# Patient Record
Sex: FEMALE | Race: WHITE | NOT HISPANIC OR LATINO
[De-identification: names, ages, dates, MRNs, and addresses within clinical notes are randomized per-mention and may not be internally consistent; named-entity substitution may affect disease eponyms.]

---

## 2018-03-05 ENCOUNTER — FORM ENCOUNTER (OUTPATIENT)
Age: 63
End: 2018-03-05

## 2019-05-13 ENCOUNTER — APPOINTMENT (OUTPATIENT)
Dept: DERMATOLOGY | Facility: CLINIC | Age: 64
End: 2019-05-13
Payer: COMMERCIAL

## 2019-05-13 VITALS — WEIGHT: 155 LBS | HEIGHT: 64 IN | BODY MASS INDEX: 26.46 KG/M2

## 2019-05-13 DIAGNOSIS — Z91.89 OTHER SPECIFIED PERSONAL RISK FACTORS, NOT ELSEWHERE CLASSIFIED: ICD-10-CM

## 2019-05-13 DIAGNOSIS — L23.7 ALLERGIC CONTACT DERMATITIS DUE TO PLANTS, EXCEPT FOOD: ICD-10-CM

## 2019-05-13 PROBLEM — Z00.00 ENCOUNTER FOR PREVENTIVE HEALTH EXAMINATION: Status: ACTIVE | Noted: 2019-05-13

## 2019-05-13 PROCEDURE — 99213 OFFICE O/P EST LOW 20 MIN: CPT

## 2019-05-13 RX ORDER — LEVOTHYROXINE SODIUM 0.12 MG/1
125 TABLET ORAL
Qty: 90 | Refills: 0 | Status: ACTIVE | COMMUNITY
Start: 2019-04-10

## 2019-05-13 RX ORDER — ATORVASTATIN CALCIUM 10 MG/1
10 TABLET, FILM COATED ORAL
Qty: 90 | Refills: 0 | Status: ACTIVE | COMMUNITY
Start: 2019-03-26

## 2019-05-13 RX ORDER — PANTOPRAZOLE 40 MG/1
40 TABLET, DELAYED RELEASE ORAL
Qty: 90 | Refills: 0 | Status: ACTIVE | COMMUNITY
Start: 2019-02-24

## 2019-05-13 NOTE — PHYSICAL EXAM
[Alert] : alert [Oriented x 3] : ~L oriented x 3 [Face] : Face [Well Nourished] : well nourished [Nose] : Nose [Eyelids] : Eyelids [Ears] : Ears [Lips] : Lips [FreeTextEntry3] : Right volar wrist:  1.5 cm, erythematous, crusted plaque with a few nearby 2X2 mm papules, some are slightly eroded\par Faint vertical linear streak present on the right proximal volar forearm

## 2019-05-13 NOTE — ASSESSMENT
[FreeTextEntry1] : Rash consistent with an allergic contact dermatitis-probably poison ivy\par ? Secondary allergic contact dermatitis to bacitracin

## 2019-05-13 NOTE — HISTORY OF PRESENT ILLNESS
[FreeTextEntry1] : Poison ivy [de-identified] : Followup visit for 63-year-old white female presenting with a persistent rash on the right wrist previously diagnosed as poison ivy. Patient was treated by her primary care physician with a Medrol dose pack. Rash subsequently spread up the right forearm. Was seen at a walk-in clinic where she was treated with an IM shot of Kenalog and a 12 day course of prednisone. The rash on the forearm has improved markedly, but the rash of the right wrist persists.\par Right wrist remains very itchy. \par Note-patient has been applying bacitracin ointment to this area.\par Note-has had poison ivy in the past.

## 2019-05-17 ENCOUNTER — OTHER (OUTPATIENT)
Age: 64
End: 2019-05-17

## 2020-09-23 ENCOUNTER — APPOINTMENT (OUTPATIENT)
Dept: DERMATOLOGY | Facility: CLINIC | Age: 65
End: 2020-09-23
Payer: COMMERCIAL

## 2020-09-23 DIAGNOSIS — L29.9 PRURITUS, UNSPECIFIED: ICD-10-CM

## 2020-09-23 DIAGNOSIS — D22.5 MELANOCYTIC NEVI OF TRUNK: ICD-10-CM

## 2020-09-23 DIAGNOSIS — L82.1 OTHER SEBORRHEIC KERATOSIS: ICD-10-CM

## 2020-09-23 PROCEDURE — 99213 OFFICE O/P EST LOW 20 MIN: CPT

## 2020-09-23 RX ORDER — HALOBETASOL PROPIONATE 0.5 MG/G
0.05 CREAM TOPICAL
Qty: 1 | Refills: 1 | Status: DISCONTINUED | COMMUNITY
Start: 2019-05-13 | End: 2020-09-23

## 2020-09-23 NOTE — ASSESSMENT
[FreeTextEntry1] : Multiple very small seborrheic keratoses present on face, neck, trunk\par Pruritus of neck of uncertain etiology? Subclinical eczematous dermatitis

## 2020-09-23 NOTE — HISTORY OF PRESENT ILLNESS
[FreeTextEntry1] : Evaluation of growths and itching of the neck [de-identified] : Followup visit for 64-year-old white female last seen by me on May 13 on the 2019, presenting for evaluation of growths.  Particularly concerned about lesions on the face.\par Patient also complains of episodic itching of the anterior neck present for a few weeks. Self treated with 1% hydrocortisone cream without improvement. No previous episodes.

## 2020-09-23 NOTE — PHYSICAL EXAM
[Alert] : alert [Oriented x 3] : ~L oriented x 3 [Well Nourished] : well nourished [FreeTextEntry3] : The following areas were examined and no significant abnormalities were seen except as noted below:\par \par Type II skin\par \par scalp, face, eyelids, nose, lips, ears, neck, chest, abdomen, back, buttocks, right arm, left arm, right hand, left hand,\par right  leg, left leg, right foot, left foot\par Breast and groin exams offered and declined by patient.\par \par Right lateral forehead: Few 2 mm tan verrucous papules\par Neck: Moderate 2 mm black verrucous papules\par No rash seen\par Back: Few tan smooth papules\par Moderate small and few moderate sized verrucous papules\par \par No suspicious lesions seen

## 2021-07-27 ENCOUNTER — FORM ENCOUNTER (OUTPATIENT)
Age: 66
End: 2021-07-27

## 2021-08-08 ENCOUNTER — FORM ENCOUNTER (OUTPATIENT)
Age: 66
End: 2021-08-08

## 2021-12-03 DIAGNOSIS — E03.9 HYPOTHYROIDISM, UNSPECIFIED: ICD-10-CM

## 2021-12-03 DIAGNOSIS — E78.00 PURE HYPERCHOLESTEROLEMIA, UNSPECIFIED: ICD-10-CM

## 2021-12-03 DIAGNOSIS — N64.59 OTHER SIGNS AND SYMPTOMS IN BREAST: ICD-10-CM

## 2021-12-03 DIAGNOSIS — Z87.39 PERSONAL HISTORY OF OTHER DISEASES OF THE MUSCULOSKELETAL SYSTEM AND CONNECTIVE TISSUE: ICD-10-CM

## 2022-07-29 ENCOUNTER — APPOINTMENT (OUTPATIENT)
Dept: OBGYN | Facility: CLINIC | Age: 67
End: 2022-07-29

## 2022-07-29 VITALS
WEIGHT: 155 LBS | BODY MASS INDEX: 26.46 KG/M2 | SYSTOLIC BLOOD PRESSURE: 126 MMHG | DIASTOLIC BLOOD PRESSURE: 74 MMHG | HEIGHT: 64 IN

## 2022-07-29 DIAGNOSIS — Z11.51 ENCOUNTER FOR SCREENING FOR HUMAN PAPILLOMAVIRUS (HPV): ICD-10-CM

## 2022-07-29 DIAGNOSIS — Z12.39 ENCOUNTER FOR OTHER SCREENING FOR MALIGNANT NEOPLASM OF BREAST: ICD-10-CM

## 2022-07-29 DIAGNOSIS — Z11.3 ENCOUNTER FOR SCREENING FOR INFECTIONS WITH A PREDOMINANTLY SEXUAL MODE OF TRANSMISSION: ICD-10-CM

## 2022-07-29 PROCEDURE — 99397 PER PM REEVAL EST PAT 65+ YR: CPT

## 2022-07-29 NOTE — HISTORY OF PRESENT ILLNESS
[Patient reported colonoscopy was normal] : Patient reported colonoscopy was normal [HIV test declined] : HIV test declined [Syphilis test declined] : Syphilis test declined [Gonorrhea test declined] : Gonorrhea test declined [Chlamydia test declined] : Chlamydia test declined [Trichomonas test declined] : Trichomonas test declined [HPV test declined] : HPV test declined [Hepatitis B test declined] : Hepatitis B test declined [postmenopausal] : postmenopausal [N] : Patient does not use contraception [Y] : Positive pregnancy history [Currently Active] : currently active [Men] : men [Vaginal] : vaginal [No] : No [TextBox_4] : PT HERE FOR ANNUAL EXAM.  [Mammogramdate] : 08/09/21 [TextBox_19] : BR2 [BreastSonogramDate] : 08/09/21 [TextBox_25] : BR2 [PapSmeardate] : 07/28/21 [TextBox_31] : NORMAL [BoneDensityDate] : 08/09/21 [TextBox_37] : OSTEOPENIA [ColonoscopyDate] : 01/2020 [LMPDate] : 2015 [PGHxTotal] : 2 [[facility] : 2 [[facility] : 2 [TextBox_6] : 2015 [FreeTextEntry1] : 2015

## 2022-07-29 NOTE — PHYSICAL EXAM
[Chaperone Present] : A chaperone was present in the examining room during all aspects of the physical examination [Appropriately responsive] : appropriately responsive [Alert] : alert [No Acute Distress] : no acute distress [No Lymphadenopathy] : no lymphadenopathy [Regular Rate Rhythm] : regular rate rhythm [No Murmurs] : no murmurs [Clear to Auscultation B/L] : clear to auscultation bilaterally [Soft] : soft [Non-tender] : non-tender [Non-distended] : non-distended [No HSM] : No HSM [No Lesions] : no lesions [No Mass] : no mass [Oriented x3] : oriented x3 [Labia Majora] : normal [Labia Minora] : normal [Atrophy] : atrophy [Normal] : normal [Uterine Adnexae] : normal [Declined] : Patient declined rectal exam [FreeTextEntry1] : SM [FreeTextEntry6] : Symmetrical [FreeTextEntry8] : No masses nontender bilateral

## 2022-07-29 NOTE — PLAN
[FreeTextEntry1] : 66-year-old postmenopausal female with atrophic vaginitis and dyspareunia patient will start Vagifem 10 mcg nightly for 2 weeks and then twice weekly all risk benefits pros cons alternatives discussed in layman's terms questions answered, breast self-examination taught her for mammogram bilateral breast sono we will repeat bone density in 1 year we will take vitamin D3 2000 IUs a day with multivitamin return in 6 months

## 2022-08-01 LAB
C TRACH RRNA SPEC QL NAA+PROBE: NOT DETECTED
HPV HIGH+LOW RISK DNA PNL CVX: NOT DETECTED
N GONORRHOEA RRNA SPEC QL NAA+PROBE: NOT DETECTED
SOURCE TP AMPLIFICATION: NORMAL

## 2022-08-03 LAB — CYTOLOGY CVX/VAG DOC THIN PREP: ABNORMAL

## 2022-08-31 ENCOUNTER — RESULT REVIEW (OUTPATIENT)
Age: 67
End: 2022-08-31

## 2023-01-03 ENCOUNTER — APPOINTMENT (OUTPATIENT)
Dept: OBGYN | Facility: CLINIC | Age: 68
End: 2023-01-03
Payer: MEDICARE

## 2023-01-03 VITALS
HEIGHT: 64 IN | SYSTOLIC BLOOD PRESSURE: 122 MMHG | BODY MASS INDEX: 26.98 KG/M2 | DIASTOLIC BLOOD PRESSURE: 70 MMHG | WEIGHT: 158 LBS

## 2023-01-03 PROCEDURE — 99213 OFFICE O/P EST LOW 20 MIN: CPT

## 2023-01-03 NOTE — PLAN
[FreeTextEntry1] : Continue Yuvafem risk-benefit analysis questions answered in layman's terms pros cons discussed.  Return in 6 months

## 2023-01-03 NOTE — HISTORY OF PRESENT ILLNESS
[postmenopausal] : postmenopausal [N] : Patient does not use contraception [Y] : Positive pregnancy history [Currently Active] : currently active [Men] : men [Vaginal] : vaginal [No] : No [TextBox_4] : PT HERE TO RENEW YUVAFEM [LMPDate] : 2015 [PGHxTotal] : 2 [[facility]] : 2 [[facility]] : 2 [TextBox_6] : 2008 [TextBox_9] : 12 [FreeTextEntry1] : 2008

## 2023-01-03 NOTE — PHYSICAL EXAM
[Chaperone Present] : A chaperone was present in the examining room during all aspects of the physical examination [Labia Majora] : normal [Labia Minora] : normal [Atrophy] : atrophy [Normal] : normal [Uterine Adnexae] : normal [FreeTextEntry1] : SM

## 2023-07-31 ENCOUNTER — NON-APPOINTMENT (OUTPATIENT)
Age: 68
End: 2023-07-31

## 2023-07-31 ENCOUNTER — APPOINTMENT (OUTPATIENT)
Dept: OBGYN | Facility: CLINIC | Age: 68
End: 2023-07-31
Payer: MEDICARE

## 2023-07-31 VITALS
HEIGHT: 64 IN | BODY MASS INDEX: 26.46 KG/M2 | SYSTOLIC BLOOD PRESSURE: 154 MMHG | DIASTOLIC BLOOD PRESSURE: 88 MMHG | WEIGHT: 155 LBS

## 2023-07-31 DIAGNOSIS — Z01.419 ENCOUNTER FOR GYNECOLOGICAL EXAMINATION (GENERAL) (ROUTINE) W/OUT ABNORMAL FINDINGS: ICD-10-CM

## 2023-07-31 DIAGNOSIS — R92.2 INCONCLUSIVE MAMMOGRAM: ICD-10-CM

## 2023-07-31 DIAGNOSIS — Z13.820 ENCOUNTER FOR SCREENING FOR OSTEOPOROSIS: ICD-10-CM

## 2023-07-31 PROCEDURE — G0101: CPT

## 2023-07-31 NOTE — PHYSICAL EXAM
[Chaperone Present] : A chaperone was present in the examining room during all aspects of the physical examination [FreeTextEntry1] : SM [Appropriately responsive] : appropriately responsive [Alert] : alert [No Acute Distress] : no acute distress [No Lymphadenopathy] : no lymphadenopathy [Regular Rate Rhythm] : regular rate rhythm [No Murmurs] : no murmurs [Clear to Auscultation B/L] : clear to auscultation bilaterally [Soft] : soft [Non-tender] : non-tender [Non-distended] : non-distended [No HSM] : No HSM [No Lesions] : no lesions [No Mass] : no mass [Oriented x3] : oriented x3 [Examination Of The Breasts] : a normal appearance [Breast Palpation Diffuse Fibrous Tissue Bilateral] : fibrocystic changes [No Masses] : no breast masses were palpable [Labia Majora] : normal [Labia Minora] : normal [Atrophy] : atrophy [Normal] : normal [Anteversion] : anteverted [Uterine Adnexae] : normal [Declined] : Patient declined rectal exam [FreeTextEntry8] : No masses nontender

## 2023-07-31 NOTE — HISTORY OF PRESENT ILLNESS
[Patient reported colonoscopy was normal] : Patient reported colonoscopy was normal [HIV test declined] : HIV test declined [Syphilis test declined] : Syphilis test declined [Gonorrhea test declined] : Gonorrhea test declined [Chlamydia test declined] : Chlamydia test declined [Trichomonas test declined] : Trichomonas test declined [HPV test declined] : HPV test declined [Hepatitis B test declined] : Hepatitis B test declined [Hepatitis C test declined] : Hepatitis C test declined [N] : Patient does not use contraception [Y] : Positive pregnancy history [N/A] : pregnancy not applicable [No] : Patient does not have concerns regarding sex [Currently Active] : currently active [Men] : men [Vaginal] : vaginal [TextBox_4] : ANNUAL/YUVAFEM RENEWAL LMP:2015 [Mammogramdate] : 8/31/22 [TextBox_19] : br2 [BreastSonogramDate] : 8/31/22 [TextBox_25] : br2 [PapSmeardate] : 7/29/22 [TextBox_31] : wnl [BoneDensityDate] : 8/9/21 [TextBox_37] : osteopenia [ColonoscopyDate] : 1/2020 [LMPDate] : 2015 [PGHxTotal] : 2 [[facility]] : 2 [[facility]] : 2 [TextBox_6] : 2015 [FreeTextEntry1] : 2015

## 2023-07-31 NOTE — PLAN
[FreeTextEntry1] : Pap smear completed, patient will have mammogram bilateral breast sonogram and bone density study we will continue Yuvafem after risk-benefit analysis completed with pros and cons and questions answered.  Continue vitamin D3 and multivitamin she will return here in 6 months for follow-up

## 2023-08-01 LAB — HPV HIGH+LOW RISK DNA PNL CVX: NOT DETECTED

## 2023-08-04 LAB — CYTOLOGY CVX/VAG DOC THIN PREP: NORMAL

## 2023-10-26 ENCOUNTER — APPOINTMENT (OUTPATIENT)
Dept: ORTHOPEDIC SURGERY | Facility: CLINIC | Age: 68
End: 2023-10-26
Payer: MEDICARE

## 2023-10-26 VITALS — HEIGHT: 64 IN | WEIGHT: 153 LBS | BODY MASS INDEX: 26.12 KG/M2

## 2023-10-26 PROCEDURE — 99204 OFFICE O/P NEW MOD 45 MIN: CPT

## 2023-10-26 PROCEDURE — 72100 X-RAY EXAM L-S SPINE 2/3 VWS: CPT

## 2023-10-26 PROCEDURE — 72070 X-RAY EXAM THORAC SPINE 2VWS: CPT

## 2023-10-26 RX ORDER — METHYLPREDNISOLONE 4 MG/1
4 TABLET ORAL
Qty: 1 | Refills: 0 | Status: ACTIVE | COMMUNITY
Start: 2023-10-26 | End: 1900-01-01

## 2023-10-26 RX ORDER — LEVOTHYROXINE SODIUM 0.15 MG/1
150 TABLET ORAL
Qty: 90 | Refills: 0 | Status: DISCONTINUED | COMMUNITY
Start: 2019-01-10 | End: 2023-10-26

## 2023-10-26 RX ORDER — FLUTICASONE PROPIONATE 0.5 MG/G
0.05 CREAM TOPICAL
Qty: 1 | Refills: 1 | Status: DISCONTINUED | COMMUNITY
Start: 2020-09-23 | End: 2023-10-26

## 2023-10-26 RX ORDER — ATORVASTATIN CALCIUM 10 MG/1
10 TABLET, FILM COATED ORAL
Refills: 0 | Status: DISCONTINUED | COMMUNITY
End: 2023-10-26

## 2023-10-27 ENCOUNTER — RESULT REVIEW (OUTPATIENT)
Age: 68
End: 2023-10-27

## 2023-11-10 ENCOUNTER — APPOINTMENT (OUTPATIENT)
Dept: ORTHOPEDIC SURGERY | Facility: CLINIC | Age: 68
End: 2023-11-10
Payer: MEDICARE

## 2023-11-10 PROCEDURE — 99214 OFFICE O/P EST MOD 30 MIN: CPT

## 2023-12-13 ENCOUNTER — APPOINTMENT (OUTPATIENT)
Dept: ORTHOPEDIC SURGERY | Facility: AMBULATORY SURGERY CENTER | Age: 68
End: 2023-12-13
Payer: MEDICARE

## 2023-12-13 PROCEDURE — 64493 INJ PARAVERT F JNT L/S 1 LEV: CPT | Mod: 50

## 2024-01-02 ENCOUNTER — TRANSCRIPTION ENCOUNTER (OUTPATIENT)
Age: 69
End: 2024-01-02

## 2024-01-04 ENCOUNTER — APPOINTMENT (OUTPATIENT)
Dept: ORTHOPEDIC SURGERY | Facility: CLINIC | Age: 69
End: 2024-01-04
Payer: MEDICARE

## 2024-01-04 PROCEDURE — 99214 OFFICE O/P EST MOD 30 MIN: CPT

## 2024-01-04 NOTE — HISTORY OF PRESENT ILLNESS
[de-identified] : Patient is f/u on Bilateral Facet joint corticosteroid injection at L4-5 on 12/13/23. Patient denies fevers chills SOB. Patient has been going to PT 2x per week at this time. Patient reports feeling 80% better at this time. Patient reports Tylenol as needed for pain.  Pain today 0/10

## 2024-01-04 NOTE — ASSESSMENT
[FreeTextEntry1] : 10/2023 MRI of L-Spine was reviewed today and is as follows:  L4-5 DDD with modic changes L4-5 bilateral facet hypertrophy causing moderate bilateral foraminal stenosis   Patient presents today for evaluation of her lumbar spine. 80-85% relief with bilateral facet joint injection at L4-5. Patient currently doing PT with good result. Recommend continuing PT.   - Continue physical therapy to regain range of motion, strengthening and symptomatic improvement. Prescription given in office today.  - Recommend NSAIDs PRN - Recommend heating pad use to decrease muscle spasm - Discussed the importance of home exercises, including but not limited to hamstring stretching and core strengthening  Patient was educated on their diagnosis today. All questions answered and patient expressed understanding.  Follow up in 2 months

## 2024-01-04 NOTE — PHYSICAL EXAM
[TextEntry] : Constitutional: Well groomed and developed.  Respiratory: Normal, unlabored breathing. No use of accessory muscles.  Skin: No rashes or ulcers. Skin warm and dry.  Psychiatric: Oriented to time, place, person and event. No acute distress. Gait: Heel to toe Patient able to walk on toes and heels.    Lumbar spine:  Posture: Normal on coronal and sagittal alignment  AROM:  Flexion 80 Extension 10  Moderate pain with simulated truncal motion   Tenderness:  Thoracic: No tenderness on palpation  Lumbar: Moderate tenderness on palpation     TTP L4-5, L5-S1 Sacrum/coccyx: no tenderness on palpation  Greater trochanteric bursa:  TTP on the right  PSIS: None    Motor:                         R             L LE:                                IS                    5             5 Quad              5             5 TA                  5             5 EHL                5             5 Gastroc         5             5                 R  L DTR: Patella  2+  2+ Achilles  2+  2+  Sensory: Light touch sensation intact T12-S1  Babinski's Sign: Negative bilaterally  Straight leg raise test: Negative bilaterally  RAMEZ test: negative bilaterally   Bilateral hip exam is as follows: Full ROM of both hips without pain

## 2024-02-02 ENCOUNTER — APPOINTMENT (OUTPATIENT)
Dept: OBGYN | Facility: CLINIC | Age: 69
End: 2024-02-02
Payer: MEDICARE

## 2024-02-02 VITALS
SYSTOLIC BLOOD PRESSURE: 150 MMHG | DIASTOLIC BLOOD PRESSURE: 86 MMHG | BODY MASS INDEX: 24.41 KG/M2 | WEIGHT: 143 LBS | HEIGHT: 64 IN

## 2024-02-02 DIAGNOSIS — N95.2 POSTMENOPAUSAL ATROPHIC VAGINITIS: ICD-10-CM

## 2024-02-02 DIAGNOSIS — Z78.0 ASYMPTOMATIC MENOPAUSAL STATE: ICD-10-CM

## 2024-02-02 PROCEDURE — 99213 OFFICE O/P EST LOW 20 MIN: CPT

## 2024-02-02 RX ORDER — ESTRADIOL 10 UG/1
10 TABLET VAGINAL
Qty: 27 | Refills: 2 | Status: ACTIVE | COMMUNITY
Start: 2022-07-29 | End: 1900-01-01

## 2024-02-02 NOTE — HISTORY OF PRESENT ILLNESS
[TextBox_4] : PT HERE FOR YUVAFEM RENEWAL LMP: 2008 [LMPDate] : 2008 [TextBox_6] : 2008 [FreeTextEntry1] : 2008

## 2024-02-02 NOTE — PHYSICAL EXAM
[Chaperone Present] : A chaperone was present in the examining room during all aspects of the physical examination [FreeTextEntry1] : [place]

## 2024-04-17 ENCOUNTER — APPOINTMENT (OUTPATIENT)
Dept: ORTHOPEDIC SURGERY | Facility: CLINIC | Age: 69
End: 2024-04-17
Payer: MEDICARE

## 2024-04-17 DIAGNOSIS — M47.817 SPONDYLOSIS W/OUT MYELOPATHY OR RADICULOPATHY, LUMBOSACRAL REGION: ICD-10-CM

## 2024-04-17 DIAGNOSIS — M48.061 SPINAL STENOSIS, LUMBAR REGION WITHOUT NEUROGENIC CLAUDICATION: ICD-10-CM

## 2024-04-17 DIAGNOSIS — M41.56 OTHER SECONDARY SCOLIOSIS, LUMBAR REGION: ICD-10-CM

## 2024-04-17 DIAGNOSIS — M51.37 OTHER INTERVERTEBRAL DISC DEGENERATION, LUMBOSACRAL REGION: ICD-10-CM

## 2024-04-17 DIAGNOSIS — M51.36 OTHER INTERVERTEBRAL DISC DEGENERATION, LUMBAR REGION: ICD-10-CM

## 2024-04-17 PROCEDURE — 99213 OFFICE O/P EST LOW 20 MIN: CPT

## 2024-04-17 NOTE — ASSESSMENT
[FreeTextEntry1] : 10/2023 MRI of L-Spine was reviewed today and is as follows:  L4-5 DDD with modic changes L4-5 bilateral facet hypertrophy causing moderate bilateral foraminal stenosis   Patient presents today for evaluation of her lumbar spine. 80-85% relief with bilateral facet joint injection at L4-5 12/2023. Patient with minimal pain today. I recommend continuing with HEP at this time.   - Patient will continue HEP as detailed in home exercise booklet given in office. Emphasized daily stretching and strengthening.   - Recommend NSAIDs PRN - Recommend heating pad use to decrease muscle spasm - Discussed the importance of home exercises, including but not limited to hamstring stretching and core strengthening  Patient was educated on their diagnosis today. All questions answered and patient expressed understanding.  Follow up PRN

## 2024-04-17 NOTE — PHYSICAL EXAM
[TextEntry] : Constitutional: Well groomed and developed.  Respiratory: Normal, unlabored breathing. No use of accessory muscles.  Skin: No rashes or ulcers. Skin warm and dry.  Psychiatric: Oriented to time, place, person and event. No acute distress. Gait: Heel to toe Patient able to walk on toes and heels.    Lumbar spine:  Posture: Normal on coronal and sagittal alignment  AROM:  Flexion 80 Extension 10  NO pain with simulated truncal motion   Tenderness:  Thoracic: No tenderness on palpation  Lumbar: NO tenderness on palpation Sacrum/coccyx: no tenderness on palpation  Greater trochanteric bursa: No TTP PSIS: None    Motor:                         R             L LE:                                IS                    5             5 Quad              5             5 TA                  5             5 EHL                5             5 Gastroc         5             5                 R  L DTR: Patella  2+  2+ Achilles  2+  2+  Sensory: Light touch sensation intact T12-S1  Babinski's Sign: Negative bilaterally  Straight leg raise test: Negative bilaterally  RAMEZ test: negative bilaterally   Bilateral hip exam is as follows: Full ROM of both hips without pain

## 2024-04-17 NOTE — HISTORY OF PRESENT ILLNESS
[de-identified] : Follow up lumbar spine. Patient states she is doing well, has no pain or discomfort. Patient admits to finishing PT. Patient reports Tylenol PRN for pain.  Pain today 0/10

## 2024-06-27 ENCOUNTER — OFFICE (OUTPATIENT)
Facility: LOCATION | Age: 69
Setting detail: OPHTHALMOLOGY
End: 2024-06-27
Payer: MEDICARE

## 2024-06-27 DIAGNOSIS — H01.005: ICD-10-CM

## 2024-06-27 DIAGNOSIS — H01.001: ICD-10-CM

## 2024-06-27 DIAGNOSIS — H01.002: ICD-10-CM

## 2024-06-27 DIAGNOSIS — H25.13: ICD-10-CM

## 2024-06-27 DIAGNOSIS — H35.40: ICD-10-CM

## 2024-06-27 DIAGNOSIS — H16.223: ICD-10-CM

## 2024-06-27 DIAGNOSIS — H43.393: ICD-10-CM

## 2024-06-27 DIAGNOSIS — H01.004: ICD-10-CM

## 2024-06-27 PROCEDURE — 92250 FUNDUS PHOTOGRAPHY W/I&R: CPT | Performed by: OPHTHALMOLOGY

## 2024-06-27 PROCEDURE — 92004 COMPRE OPH EXAM NEW PT 1/>: CPT | Performed by: OPHTHALMOLOGY

## 2024-06-27 ASSESSMENT — LID EXAM ASSESSMENTS
OS_BLEPHARITIS: LLL LUL T
OD_BLEPHARITIS: RLL RUL T

## 2024-06-27 ASSESSMENT — CONFRONTATIONAL VISUAL FIELD TEST (CVF)
OD_FINDINGS: FULL
OS_FINDINGS: FULL

## 2024-08-05 ENCOUNTER — APPOINTMENT (OUTPATIENT)
Dept: OBGYN | Facility: CLINIC | Age: 69
End: 2024-08-05

## 2024-08-05 ENCOUNTER — NON-APPOINTMENT (OUTPATIENT)
Age: 69
End: 2024-08-05

## 2024-08-05 PROBLEM — R92.30 DENSE BREASTS: Status: ACTIVE | Noted: 2022-07-29

## 2024-08-05 PROCEDURE — 99213 OFFICE O/P EST LOW 20 MIN: CPT

## 2024-08-05 NOTE — HISTORY OF PRESENT ILLNESS
[HIV test declined] : HIV test declined [Syphilis test declined] : Syphilis test declined [Gonorrhea test declined] : Gonorrhea test declined [Chlamydia test declined] : Chlamydia test declined [Trichomonas test declined] : Trichomonas test declined [HPV test declined] : HPV test declined [Hepatitis B test declined] : Hepatitis B test declined [Hepatitis C test declined] : Hepatitis C test declined [N] : Patient does not use contraception [Y] : Positive pregnancy history [Currently Active] : currently active [Men] : men [Vaginal] : vaginal [No] : No [Patient reported colonoscopy was normal] : Patient reported colonoscopy was normal [FreeTextEntry1] : 68-year-old postmenopausal female LMP 2008 presents for annual examination.  Patient denies abnormal discharge bleeding or vaginal dryness she has no GI or  complaints at this time.  She does complain of vaginal dryness with mild dyspareunia. [TextBox_4] : PT HERE FOR ANNUAL EXAM *NO PAP UNTIL 2025* LMP: 2008 [Mammogramdate] : 9/1/23 [TextBox_19] : BR2 [BreastSonogramDate] : 9/1/2023 [TextBox_25] : BR2 [PapSmeardate] : 7/29/22 [TextBox_31] : WNL [BoneDensityDate] : 9/1/23 [TextBox_37] : OSTEOPENIA [ColonoscopyDate] : 2023 [LMPDate] : 2008 [PGHxTotal] : 2 [[facility]] : 2 [[facility]] : 2 [TextBox_6] : 2008

## 2024-08-05 NOTE — PLAN
27.5
[FreeTextEntry1] : Patient needs mammogram bilateral breast sonogram continue Yuvafem for vaginal dryness and atrophy.  Patient with significant improvement clinically all risk benefits pros cons discussed questions answered continue vitamin D3 multivitamin will repeat bone density in 1 year.

## 2024-08-05 NOTE — PHYSICAL EXAM
[Chaperone Present] : A chaperone was present in the examining room during all aspects of the physical examination [Appropriately responsive] : appropriately responsive [Alert] : alert [No Acute Distress] : no acute distress [No Lymphadenopathy] : no lymphadenopathy [Regular Rate Rhythm] : regular rate rhythm [No Murmurs] : no murmurs [Clear to Auscultation B/L] : clear to auscultation bilaterally [Soft] : soft [Non-tender] : non-tender [Non-distended] : non-distended [No HSM] : No HSM [No Lesions] : no lesions [No Mass] : no mass [Oriented x3] : oriented x3 [Examination Of The Breasts] : a normal appearance [Breast Palpation Diffuse Fibrous Tissue Bilateral] : fibrocystic changes [No Masses] : no breast masses were palpable [Labia Majora] : normal [Labia Minora] : normal [Normal] : normal [Anteversion] : anteverted [Uterine Adnexae] : normal [Declined] : Patient declined rectal exam [FreeTextEntry2] : KF [FreeTextEntry6] : Symmetrical no masses nontender

## 2024-08-05 NOTE — REVIEW OF SYSTEMS
[Patient Intake Form Reviewed] : Patient intake form was reviewed [Negative] : Heme/Lymph [FreeTextEntry8] : Vaginal dryness, dyspareunia

## 2024-08-05 NOTE — COUNSELING
[Vitamins/Supplements] : vitamins/supplements [Breast Self Exam] : breast self exam [Vaccines] : vaccines [Influenza Vaccine] : influenza vaccine

## 2024-09-03 ENCOUNTER — RESULT REVIEW (OUTPATIENT)
Age: 69
End: 2024-09-03

## 2025-06-26 ENCOUNTER — OFFICE (OUTPATIENT)
Facility: LOCATION | Age: 70
Setting detail: OPHTHALMOLOGY
End: 2025-06-26
Payer: MEDICARE

## 2025-06-26 DIAGNOSIS — H10.433: ICD-10-CM

## 2025-06-26 DIAGNOSIS — H43.393: ICD-10-CM

## 2025-06-26 DIAGNOSIS — H25.13: ICD-10-CM

## 2025-06-26 PROCEDURE — 92014 COMPRE OPH EXAM EST PT 1/>: CPT | Performed by: OPHTHALMOLOGY

## 2025-06-26 ASSESSMENT — CONFRONTATIONAL VISUAL FIELD TEST (CVF)
OD_FINDINGS: FULL
OS_FINDINGS: FULL

## 2025-06-26 ASSESSMENT — TONOMETRY
OS_IOP_MMHG: 15
OD_IOP_MMHG: 15

## 2025-06-26 ASSESSMENT — SUPERFICIAL PUNCTATE KERATITIS (SPK)
OD_SPK: T
OS_SPK: T

## 2025-06-26 ASSESSMENT — LID EXAM ASSESSMENTS
OD_BLEPHARITIS: RLL RUL T
OS_BLEPHARITIS: LLL LUL T

## 2025-06-28 ASSESSMENT — REFRACTION_CURRENTRX
OD_OVR_VA: 20/
OD_AXIS: 038
OS_AXIS: 125
OD_SPHERE: +2.00
OS_CYLINDER: +1.75
OS_VPRISM_DIRECTION: SV
OS_OVR_VA: 20/
OD_CYLINDER: +1.25
OD_VPRISM_DIRECTION: SV
OS_SPHERE: +1.75

## 2025-06-28 ASSESSMENT — REFRACTION_AUTOREFRACTION
OS_CYLINDER: +2.25
OS_AXIS: 129
OD_CYLINDER: +1.75
OD_AXIS: 038
OD_SPHERE: -0.50
OS_SPHERE: -1.00

## 2025-06-28 ASSESSMENT — VISUAL ACUITY
OD_BCVA: 20/40-2
OS_BCVA: 20/50+2

## 2025-08-06 ENCOUNTER — APPOINTMENT (OUTPATIENT)
Dept: OBGYN | Facility: CLINIC | Age: 70
End: 2025-08-06
Payer: MEDICARE

## 2025-08-06 ENCOUNTER — NON-APPOINTMENT (OUTPATIENT)
Age: 70
End: 2025-08-06

## 2025-08-06 VITALS
WEIGHT: 155 LBS | BODY MASS INDEX: 26.46 KG/M2 | HEART RATE: 91 BPM | DIASTOLIC BLOOD PRESSURE: 85 MMHG | SYSTOLIC BLOOD PRESSURE: 149 MMHG | HEIGHT: 64 IN

## 2025-08-06 DIAGNOSIS — N95.2 POSTMENOPAUSAL ATROPHIC VAGINITIS: ICD-10-CM

## 2025-08-06 DIAGNOSIS — Z12.39 ENCOUNTER FOR OTHER SCREENING FOR MALIGNANT NEOPLASM OF BREAST: ICD-10-CM

## 2025-08-06 DIAGNOSIS — Z01.419 ENCOUNTER FOR GYNECOLOGICAL EXAMINATION (GENERAL) (ROUTINE) W/OUT ABNORMAL FINDINGS: ICD-10-CM

## 2025-08-06 DIAGNOSIS — Z13.820 ENCOUNTER FOR SCREENING FOR OSTEOPOROSIS: ICD-10-CM

## 2025-08-06 PROCEDURE — G0101: CPT

## 2025-08-08 LAB — HPV HIGH+LOW RISK DNA PNL CVX: NOT DETECTED

## 2025-08-11 DIAGNOSIS — L23.7 ALLERGIC CONTACT DERMATITIS DUE TO PLANTS, EXCEPT FOOD: ICD-10-CM

## 2025-09-04 ENCOUNTER — RESULT REVIEW (OUTPATIENT)
Age: 70
End: 2025-09-04